# Patient Record
Sex: FEMALE | NOT HISPANIC OR LATINO | URBAN - METROPOLITAN AREA
[De-identification: names, ages, dates, MRNs, and addresses within clinical notes are randomized per-mention and may not be internally consistent; named-entity substitution may affect disease eponyms.]

---

## 2017-08-21 ENCOUNTER — IMPORTED ENCOUNTER (OUTPATIENT)
Dept: URBAN - METROPOLITAN AREA CLINIC 50 | Facility: CLINIC | Age: 72
End: 2017-08-21

## 2017-08-23 ENCOUNTER — IMPORTED ENCOUNTER (OUTPATIENT)
Dept: URBAN - METROPOLITAN AREA CLINIC 50 | Facility: CLINIC | Age: 72
End: 2017-08-23

## 2018-08-29 ENCOUNTER — IMPORTED ENCOUNTER (OUTPATIENT)
Dept: URBAN - METROPOLITAN AREA CLINIC 50 | Facility: CLINIC | Age: 73
End: 2018-08-29

## 2019-09-18 ENCOUNTER — IMPORTED ENCOUNTER (OUTPATIENT)
Dept: URBAN - METROPOLITAN AREA CLINIC 50 | Facility: CLINIC | Age: 74
End: 2019-09-18

## 2020-10-28 ENCOUNTER — IMPORTED ENCOUNTER (OUTPATIENT)
Dept: URBAN - METROPOLITAN AREA CLINIC 50 | Facility: CLINIC | Age: 75
End: 2020-10-28

## 2020-10-28 NOTE — PATIENT DISCUSSION
"""Call if vision decreases or RD warning signs increases/RD warnings given. No signs of retinal tear. Retinal detachment precautions discussed.  """ sent home w/ family/friend

## 2020-10-28 NOTE — PATIENT DISCUSSION
"""Discussed dry eye diagnosis with patient.  Educated patient on proper lid hygiene and stressed importance of lid massages and the use of warm compresses and artificial tears."" ""Increase Artificial tears both eyes [id number] ."""

## 2021-02-08 ENCOUNTER — IMPORTED ENCOUNTER (OUTPATIENT)
Dept: URBAN - METROPOLITAN AREA CLINIC 50 | Facility: CLINIC | Age: 76
End: 2021-02-08

## 2021-02-09 ENCOUNTER — IMPORTED ENCOUNTER (OUTPATIENT)
Dept: URBAN - METROPOLITAN AREA CLINIC 50 | Facility: CLINIC | Age: 76
End: 2021-02-09

## 2021-02-23 ENCOUNTER — PREPPED CHART (OUTPATIENT)
Dept: URBAN - METROPOLITAN AREA CLINIC 48 | Facility: CLINIC | Age: 76
End: 2021-02-23

## 2021-04-18 ASSESSMENT — TONOMETRY
OS_IOP_MMHG: 17
OD_IOP_MMHG: 17
OD_IOP_MMHG: 22
OS_IOP_MMHG: 18
OD_IOP_MMHG: 18
OS_IOP_MMHG: 21
OS_IOP_MMHG: 20
OD_IOP_MMHG: 19
OD_IOP_MMHG: 18
OS_IOP_MMHG: 18
OS_IOP_MMHG: 17
OD_IOP_MMHG: 18

## 2021-04-18 ASSESSMENT — VISUAL ACUITY
OD_CC: J1+
OD_CC: J1+@ 16 IN
OD_PH: @ 14 IN
OS_SC: 20/30
OD_SC: 20/25-1
OS_CC: J1+
OS_SC: 20/20-
OD_CC: J1+
OS_SC: 20/25-1
OS_CC: J1+@ 16 IN
OS_PH: @ 14 IN
OS_CC: J1+@ 14 IN
OD_PH: @ 16 IN
OS_SC: 20/30-2
OD_SC: 20/20-1
OS_SC: 20/20
OD_SC: 20/20
OS_PH: @ 16 IN
OD_SC: 20/25-2
OD_SC: 20/25
OD_CC: J1+@ 14 IN
OD_SC: 20/20-
OS_CC: J1+
OS_SC: 20/25-

## 2021-10-21 ENCOUNTER — PREPPED CHART (OUTPATIENT)
Dept: URBAN - METROPOLITAN AREA CLINIC 48 | Facility: CLINIC | Age: 76
End: 2021-10-21

## 2021-10-27 ENCOUNTER — ANNUAL COMPREHENSIVE EXAM (OUTPATIENT)
Dept: URBAN - METROPOLITAN AREA CLINIC 48 | Facility: CLINIC | Age: 76
End: 2021-10-27

## 2021-10-27 DIAGNOSIS — H43.813: ICD-10-CM

## 2021-10-27 DIAGNOSIS — H35.363: ICD-10-CM

## 2021-10-27 DIAGNOSIS — H04.123: ICD-10-CM

## 2021-10-27 PROCEDURE — 92014 COMPRE OPH EXAM EST PT 1/>: CPT

## 2021-10-27 RX ORDER — OLOPATADINE HYDROCHLORIDE 2 MG/ML
1 SOLUTION OPHTHALMIC ONCE A DAY
Start: 2021-10-27

## 2021-10-27 ASSESSMENT — TONOMETRY
OS_IOP_MMHG: 18
OD_IOP_MMHG: 18

## 2021-10-27 ASSESSMENT — VISUAL ACUITY
OD_SC: 20/25-2
OS_SC: 20/30
OU_SC: J1

## 2021-10-27 NOTE — PATIENT DISCUSSION
Advised patient to increase preservative-free artificial tears OU 4-6x a day, warm compresses BID OU, and lid scrubs BID OU.

## 2022-10-26 ENCOUNTER — COMPREHENSIVE EXAM (OUTPATIENT)
Dept: URBAN - METROPOLITAN AREA CLINIC 48 | Facility: LOCATION | Age: 77
End: 2022-10-26

## 2022-10-26 DIAGNOSIS — H04.123: ICD-10-CM

## 2022-10-26 DIAGNOSIS — H35.363: ICD-10-CM

## 2022-10-26 DIAGNOSIS — H43.813: ICD-10-CM

## 2022-10-26 PROCEDURE — 92014 COMPRE OPH EXAM EST PT 1/>: CPT

## 2022-10-26 ASSESSMENT — TONOMETRY
OS_IOP_MMHG: 16
OD_IOP_MMHG: 16

## 2022-10-26 ASSESSMENT — VISUAL ACUITY
OD_SC: 20/30
OU_SC: J1+
OS_SC: 20/30-2

## 2022-10-26 NOTE — PATIENT DISCUSSION
Advised patient to increase preservative-free artificial tears OU to 4-6x a day, warm compresses BID OU, and lid scrubs BID OU.

## 2023-11-01 ENCOUNTER — COMPREHENSIVE EXAM (OUTPATIENT)
Dept: URBAN - METROPOLITAN AREA CLINIC 48 | Facility: LOCATION | Age: 78
End: 2023-11-01

## 2023-11-01 DIAGNOSIS — H04.123: ICD-10-CM

## 2023-11-01 DIAGNOSIS — H43.813: ICD-10-CM

## 2023-11-01 DIAGNOSIS — H35.363: ICD-10-CM

## 2023-11-01 PROCEDURE — 92014 COMPRE OPH EXAM EST PT 1/>: CPT

## 2023-11-01 PROCEDURE — 92134 CPTRZ OPH DX IMG PST SGM RTA: CPT

## 2023-11-01 ASSESSMENT — VISUAL ACUITY
OD_PH: 20/25-1
OS_SC: 20/40
OU_SC: J1+@15
OS_PH: 20/25
OD_SC: 20/30

## 2023-11-01 ASSESSMENT — TONOMETRY
OS_IOP_MMHG: 19
OD_IOP_MMHG: 18

## 2023-11-01 ASSESSMENT — KERATOMETRY
OD_K1POWER_DIOPTERS: 44.00
OD_K2POWER_DIOPTERS: 43.25
OS_K1POWER_DIOPTERS: 44.50
OD_AXISANGLE_DEGREES: 180
OD_AXISANGLE2_DEGREES: 90
OS_K2POWER_DIOPTERS: 43.75
OS_AXISANGLE_DEGREES: 155
OS_AXISANGLE2_DEGREES: 65

## 2024-12-02 ENCOUNTER — COMPREHENSIVE EXAM (OUTPATIENT)
Age: 79
End: 2024-12-02

## 2024-12-02 DIAGNOSIS — H35.363: ICD-10-CM

## 2024-12-02 PROCEDURE — 99214 OFFICE O/P EST MOD 30 MIN: CPT

## 2024-12-02 PROCEDURE — 92134 CPTRZ OPH DX IMG PST SGM RTA: CPT

## 2025-03-03 ENCOUNTER — FOLLOW UP (OUTPATIENT)
Age: 80
End: 2025-03-03

## 2025-03-03 DIAGNOSIS — H04.123: ICD-10-CM

## 2025-03-03 PROCEDURE — 99213 OFFICE O/P EST LOW 20 MIN: CPT
